# Patient Record
Sex: FEMALE | HISPANIC OR LATINO | URBAN - METROPOLITAN AREA
[De-identification: names, ages, dates, MRNs, and addresses within clinical notes are randomized per-mention and may not be internally consistent; named-entity substitution may affect disease eponyms.]

---

## 2018-11-13 ENCOUNTER — OFFICE VISIT (OUTPATIENT)
Dept: URBAN - METROPOLITAN AREA CLINIC 48 | Facility: CLINIC | Age: 78
End: 2018-11-13
Payer: MEDICARE

## 2018-11-13 DIAGNOSIS — S00.209A: ICD-10-CM

## 2018-11-13 DIAGNOSIS — H35.373 PUCKERING OF MACULA, BILATERAL: Primary | ICD-10-CM

## 2018-11-13 PROCEDURE — 92012 INTRM OPH EXAM EST PATIENT: CPT | Performed by: OPHTHALMOLOGY

## 2018-11-13 ASSESSMENT — INTRAOCULAR PRESSURE
OD: 18
OS: 18

## 2018-11-13 NOTE — IMPRESSION/PLAN
Impression: Superficial injury of eyelid area, initial encounter: S00.118A.  Plan: plastic consult Dr. Indigo Franklin for nasal cutanius horn OS causing tearing

## 2018-11-28 ENCOUNTER — OFFICE VISIT (OUTPATIENT)
Dept: URBAN - METROPOLITAN AREA CLINIC 48 | Facility: CLINIC | Age: 78
End: 2018-11-28
Payer: MEDICARE

## 2018-11-28 DIAGNOSIS — S05.00XS CORNEAL ABRASION WITHOUT FB OF EYE, SEQUELA: Primary | ICD-10-CM

## 2018-11-28 PROCEDURE — 92012 INTRM OPH EXAM EST PATIENT: CPT | Performed by: OPTOMETRIST

## 2018-11-28 RX ORDER — ERYTHROMYCIN 5 MG/G
OINTMENT OPHTHALMIC
Qty: 1 | Refills: 2 | Status: INACTIVE
Start: 2018-11-28 | End: 2018-12-05

## 2018-11-28 NOTE — IMPRESSION/PLAN
Impression: Corneal abrasion without FB of eye, sequela: S05.00xS. OD abrasion healing well. diffuse punctate erosion present. Plan: Discussed importance of JACQUELINE management. Change to tristan TID OD to allow for better healing.

## 2018-12-05 ENCOUNTER — OFFICE VISIT (OUTPATIENT)
Dept: URBAN - METROPOLITAN AREA CLINIC 48 | Facility: CLINIC | Age: 78
End: 2018-12-05
Payer: MEDICARE

## 2018-12-05 PROCEDURE — 99213 OFFICE O/P EST LOW 20 MIN: CPT | Performed by: OPHTHALMOLOGY

## 2018-12-05 PROCEDURE — 92134 CPTRZ OPH DX IMG PST SGM RTA: CPT | Performed by: OPHTHALMOLOGY

## 2018-12-05 ASSESSMENT — INTRAOCULAR PRESSURE
OD: 18
OS: 16

## 2018-12-05 NOTE — IMPRESSION/PLAN
Impression: Puckering of macula, bilateral: H35.373. Plan: OCT ordered and performed today. Discussed diagnosis with patient. The clinical exam was consistent with Epiretinal membrane. The diagnosis and natural history and prognosis of Epiretinal membrane, as well as the risks and benefits with Vitrectomy with membrane peeling were discussed. Discussed with Patient I recommend checking her vision one eye at a time daily. If Patient notices any changes small or large come in sooner. Patient understands and agrees with plan. Given the current visual acuity the patient elects to observe for now.

## 2019-02-12 ENCOUNTER — OFFICE VISIT (OUTPATIENT)
Dept: URBAN - METROPOLITAN AREA CLINIC 48 | Facility: CLINIC | Age: 79
End: 2019-02-12
Payer: MEDICARE

## 2019-02-12 PROCEDURE — 99214 OFFICE O/P EST MOD 30 MIN: CPT | Performed by: OPHTHALMOLOGY

## 2019-02-12 PROCEDURE — 68840 EXPLORE/IRRIGATE TEAR DUCTS: CPT | Performed by: OPHTHALMOLOGY

## 2019-02-12 RX ORDER — NEOMYCIN SULFATE, POLYMYXIN B SULFATE AND DEXAMETHASONE 3.5; 10000; 1 MG/ML; [USP'U]/ML; MG/ML
SUSPENSION OPHTHALMIC
Qty: 10 | Refills: 2 | Status: INACTIVE
Start: 2019-02-12 | End: 2019-06-05

## 2019-02-12 ASSESSMENT — INTRAOCULAR PRESSURE
OD: 19
OS: 17

## 2019-02-12 NOTE — IMPRESSION/PLAN
Impression: Epiphora due to insufficient drainage, bi lacrimal glands: J32.493. Plan: Discussed diagnosis in detail with patient. Discussed treatment options with patient. Recommend patient continue AT gtts QID  for comfort and gel At QHS OU. Start Maxitrol gtts TID OU x 1 month then will revaluate possible irrigation on next visit. Probing and irrigation done today see procedure note.

## 2019-03-26 ENCOUNTER — OFFICE VISIT (OUTPATIENT)
Dept: URBAN - METROPOLITAN AREA CLINIC 48 | Facility: CLINIC | Age: 79
End: 2019-03-26
Payer: MEDICARE

## 2019-03-26 DIAGNOSIS — H04.553 ACQUIRED STENOSIS OF BILATERAL NASOLACRIMAL DUCTS: ICD-10-CM

## 2019-03-26 DIAGNOSIS — H04.223 EPIPHORA DUE TO INSUFFICIENT DRAINAGE, BI LACRIMAL GLANDS: Primary | ICD-10-CM

## 2019-03-26 PROCEDURE — 99214 OFFICE O/P EST MOD 30 MIN: CPT | Performed by: OPHTHALMOLOGY

## 2019-03-26 PROCEDURE — 31231 NASAL ENDOSCOPY DX: CPT | Performed by: OPHTHALMOLOGY

## 2019-03-26 RX ORDER — NEOMYCIN SULFATE, POLYMYXIN B SULFATE AND DEXAMETHASONE 3.5; 10000; 1 MG/ML; [USP'U]/ML; MG/ML
SUSPENSION OPHTHALMIC
Qty: 10 | Refills: 0 | Status: INACTIVE
Start: 2019-03-26 | End: 2019-06-04

## 2019-03-26 RX ORDER — CEPHALEXIN 500 MG/1
500 MG CAPSULE ORAL
Qty: 15 | Refills: 0 | Status: INACTIVE
Start: 2019-03-26 | End: 2019-06-04

## 2019-03-26 ASSESSMENT — INTRAOCULAR PRESSURE
OS: 18
OD: 19

## 2019-03-26 NOTE — IMPRESSION/PLAN
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 Left more than right. Plan: Discussed diagnosis in detail with patient. Discussed treatment options with patient. Surgical risks and benefits were discussed, explained and understood by patient. Surgical treatment is required. Patient elects to have surgery. Recommend External DCR Left side. Guarded prognosis, previous DCR surgery bilateral done by Dr. Marline Emery. will schedule surgery at outside surgical center with general anesthesia capabilities. RL2. D/C Maxitrol gtts today OU.

## 2019-03-26 NOTE — IMPRESSION/PLAN
Impression: Epiphora due to insufficient drainage, bi lacrimal glands: O09.645. Plan: See plan #2. Bilateral probing and irrigation and bilateral endoscope done today. See procedure note.

## 2019-06-05 ENCOUNTER — OFFICE VISIT (OUTPATIENT)
Dept: URBAN - METROPOLITAN AREA CLINIC 48 | Facility: CLINIC | Age: 79
End: 2019-06-05
Payer: MEDICARE

## 2019-06-05 PROCEDURE — 92134 CPTRZ OPH DX IMG PST SGM RTA: CPT | Performed by: OPHTHALMOLOGY

## 2019-06-05 PROCEDURE — 99213 OFFICE O/P EST LOW 20 MIN: CPT | Performed by: OPHTHALMOLOGY

## 2019-06-05 ASSESSMENT — INTRAOCULAR PRESSURE
OD: 18
OS: 17

## 2019-06-05 NOTE — IMPRESSION/PLAN
Impression: Puckering of macula, bilateral: H35.373. OU. Plan: OCT ordered and performed today. Discussed diagnosis with patient. The clinical exam was consistent with Epiretinal membrane. The diagnosis and natural history and prognosis of Epiretinal membrane, as well as the risks and benefits with Vitrectomy with membrane peeling were discussed. Given the current visual acuity  the patient elects to observe for now.

## 2019-06-07 ENCOUNTER — Encounter (OUTPATIENT)
Dept: URBAN - METROPOLITAN AREA EXTERNAL CLINIC 17 | Facility: EXTERNAL CLINIC | Age: 79
End: 2019-06-07
Payer: MEDICARE

## 2019-06-07 PROCEDURE — 31254 NSL/SINS NDSC W/PRTL ETHMDCT: CPT | Performed by: OPHTHALMOLOGY

## 2019-06-07 PROCEDURE — 68720 CREATE TEAR SAC DRAIN: CPT | Performed by: OPHTHALMOLOGY

## 2019-06-10 ENCOUNTER — POST-OPERATIVE VISIT (OUTPATIENT)
Dept: URBAN - METROPOLITAN AREA CLINIC 48 | Facility: CLINIC | Age: 79
End: 2019-06-10
Payer: MEDICARE

## 2019-06-10 PROCEDURE — 99024 POSTOP FOLLOW-UP VISIT: CPT | Performed by: OPHTHALMOLOGY

## 2019-07-02 ENCOUNTER — POST-OPERATIVE VISIT (OUTPATIENT)
Dept: URBAN - METROPOLITAN AREA CLINIC 48 | Facility: CLINIC | Age: 79
End: 2019-07-02

## 2019-07-02 DIAGNOSIS — Z09 ENCNTR FOR F/U EXAM AFT TRTMT FOR COND OTH THAN MALIG NEOPLM: Primary | ICD-10-CM

## 2019-07-02 PROCEDURE — 99024 POSTOP FOLLOW-UP VISIT: CPT | Performed by: OPHTHALMOLOGY

## 2019-07-02 RX ORDER — NEOMYCIN SULFATE, POLYMYXIN B SULFATE AND DEXAMETHASONE 3.5; 10000; 1 MG/ML; [USP'U]/ML; MG/ML
SUSPENSION OPHTHALMIC
Qty: 5 | Refills: 1 | Status: INACTIVE
Start: 2019-07-02 | End: 2019-08-12

## 2019-07-02 ASSESSMENT — INTRAOCULAR PRESSURE: OS: 14

## 2019-07-16 ENCOUNTER — POST-OPERATIVE VISIT (OUTPATIENT)
Dept: URBAN - METROPOLITAN AREA CLINIC 48 | Facility: CLINIC | Age: 79
End: 2019-07-16

## 2019-07-16 ASSESSMENT — INTRAOCULAR PRESSURE
OD: 15
OS: 18

## 2019-08-27 ENCOUNTER — POST-OPERATIVE VISIT (OUTPATIENT)
Dept: URBAN - METROPOLITAN AREA CLINIC 48 | Facility: CLINIC | Age: 79
End: 2019-08-27

## 2019-08-27 PROCEDURE — 99024 POSTOP FOLLOW-UP VISIT: CPT | Performed by: OPHTHALMOLOGY

## 2019-08-27 ASSESSMENT — INTRAOCULAR PRESSURE
OD: 17
OS: 30

## 2019-09-10 ENCOUNTER — OFFICE VISIT (OUTPATIENT)
Dept: URBAN - METROPOLITAN AREA CLINIC 48 | Facility: CLINIC | Age: 79
End: 2019-09-10
Payer: MEDICARE

## 2019-09-10 DIAGNOSIS — T17.0XXA FOREIGN BODY IN NASAL SINUS, INITIAL ENCOUNTER: Primary | ICD-10-CM

## 2019-09-10 PROCEDURE — 31231 NASAL ENDOSCOPY DX: CPT | Performed by: OPHTHALMOLOGY

## 2019-09-10 PROCEDURE — 30300 REMOVE NASAL FOREIGN BODY: CPT | Performed by: OPHTHALMOLOGY

## 2019-09-10 PROCEDURE — 99214 OFFICE O/P EST MOD 30 MIN: CPT | Performed by: OPHTHALMOLOGY

## 2019-09-10 RX ORDER — TRIAMCINOLONE ACETONIDE 55 MCG
AEROSOL, SPRAY (ML) NASAL
Qty: 1 | Refills: 2 | Status: ACTIVE
Start: 2019-09-10

## 2019-09-10 RX ORDER — NEOMYCIN SULFATE, POLYMYXIN B SULFATE AND DEXAMETHASONE 3.5; 10000; 1 MG/ML; [USP'U]/ML; MG/ML
SUSPENSION OPHTHALMIC
Qty: 10 | Refills: 0 | Status: INACTIVE
Start: 2019-09-10 | End: 2019-12-17

## 2019-09-10 NOTE — IMPRESSION/PLAN
Impression: Epiphora due to insufficient drainage, bi lacrimal glands: H04.223. nasal lacrimal duct obstruction/dacryocystitis resolved Plan: Discussed diagnosis in detail with patient. Discussed treatment options with patient. continue rinse TID start nasacort TID Start Maxitrol TID OS x 2wk then f/u

## 2019-09-24 ENCOUNTER — OFFICE VISIT (OUTPATIENT)
Dept: URBAN - METROPOLITAN AREA CLINIC 48 | Facility: CLINIC | Age: 79
End: 2019-09-24
Payer: MEDICARE

## 2019-09-24 PROCEDURE — 99214 OFFICE O/P EST MOD 30 MIN: CPT | Performed by: OPHTHALMOLOGY

## 2019-09-24 ASSESSMENT — INTRAOCULAR PRESSURE
OD: 16
OS: 19

## 2019-09-24 NOTE — IMPRESSION/PLAN
Impression: Epiphora due to insufficient drainage, bi lacrimal glands: H04.223. nasal lacrimal duct obstruction/dacryocystitis resolved Plan: Discussed diagnosis in detail with patient. Discussed treatment options with patient. continue rinse TID start nasacort TID 
continue Maxitrol QHS until gone.  Will re-irrgated 3 months bilateral. DCR NLDO OS resolved

## 2019-12-17 ENCOUNTER — OFFICE VISIT (OUTPATIENT)
Dept: URBAN - METROPOLITAN AREA CLINIC 48 | Facility: CLINIC | Age: 79
End: 2019-12-17
Payer: MEDICARE

## 2019-12-17 PROCEDURE — 99214 OFFICE O/P EST MOD 30 MIN: CPT | Performed by: OPHTHALMOLOGY

## 2019-12-17 PROCEDURE — 31231 NASAL ENDOSCOPY DX: CPT | Performed by: OPHTHALMOLOGY

## 2019-12-17 ASSESSMENT — INTRAOCULAR PRESSURE
OD: 14
OS: 15

## 2019-12-17 NOTE — IMPRESSION/PLAN
Impression: Epiphora due to insufficient drainage, bi lacrimal glands: H04.223. nasal lacrimal duct obstruction/dacryocystitis resolved Plan: Discussed diagnosis in detail with patient. Discussed treatment options with patient. continue rinse TID start nasacort TID. Resolved Endoscope done today see procedure note